# Patient Record
(demographics unavailable — no encounter records)

---

## 2025-04-02 NOTE — DISCUSSION/SUMMARY
[de-identified] : 17 Y  Patient will start PT and HEP to reduce pain. If she does not respond to therapy, an MRI and injections could be considered. Follow up in 6 weeks.

## 2025-04-02 NOTE — DATA REVIEWED
[FreeTextEntry1] : Reviewed BT Sacrum & Coccys x-rays from 11/24/2024: Reveals no fractures  I stop paperwork reviewed Ortho progress notes reviewed

## 2025-04-02 NOTE — HISTORY OF PRESENT ILLNESS
[Dull/Aching] : dull/aching [Sharp] : sharp [de-identified] : 03/31/2025: 17 Y F presents for initial Evalution for L spine pain since Nov 2024. She has been treated at  and got x-rays done.  Patient reports tripping over Somone's foot in basketball and fell backwards landing on her tailbone. She is currently doing Flag football and plays basketball. Patient reports shooting pains from L spine to T spine. Has been using donut pillow for comfort. Took Motrin with no relief. Has no radicular complaints. Has not attended PT yet.   [] : no [FreeTextEntry1] : L spine and T spine [FreeTextEntry5] : Currently doing Flag football, plays basketball  [FreeTextEntry2] : tripped over someone's foot in basketball and fell backwards landing on her tailbone  [FreeTextEntry7] : L spine to T spine [de-identified] : movement [de-identified] : XR [de-identified] : [facility] [de-identified] : XR

## 2025-04-02 NOTE — DISCUSSION/SUMMARY
[de-identified] : 17 Y  Patient will start PT and HEP to reduce pain. If she does not respond to therapy, an MRI and injections could be considered. Follow up in 6 weeks.

## 2025-04-02 NOTE — HISTORY OF PRESENT ILLNESS
[Dull/Aching] : dull/aching [Sharp] : sharp [de-identified] : 03/31/2025: 17 Y F presents for initial Evalution for L spine pain since Nov 2024. She has been treated at  and got x-rays done.  Patient reports tripping over Somone's foot in basketball and fell backwards landing on her tailbone. She is currently doing Flag football and plays basketball. Patient reports shooting pains from L spine to T spine. Has been using donut pillow for comfort. Took Motrin with no relief. Has no radicular complaints. Has not attended PT yet.   [] : no [FreeTextEntry1] : L spine and T spine [FreeTextEntry2] : tripped over someone's foot in basketball and fell backwards landing on her tailbone  [FreeTextEntry5] : Currently doing Flag football, plays basketball  [FreeTextEntry7] : L spine to T spine [de-identified] : movement [de-identified] : XR [de-identified] : [facility] [de-identified] : XR

## 2025-04-02 NOTE — PHYSICAL EXAM
[NL (90)] : forward flexion 90 degrees [NL (30)] : right lateral rotation 30 degrees [NL (45)] : extension 45 degrees [NL (40)] : right lateral bending 40 degrees [Extension] : extension [5___] : right extensor hallicus longus 5[unfilled]/5 [Normal Coordination] : normal coordination [Normal DTR UE/LE] : normal DTR UE/LE  [Normal Sensation] : normal sensation [Normal Mood and Affect] : normal mood and affect [Oriented] : oriented [Able to Communicate] : able to communicate [Normal Skin] : normal skin [No Rash] : no rash [No Ulcers] : no ulcers [No Lesions] : no lesions [No obvious lymphadenopathy in areas examined] : no obvious lymphadenopathy in areas examined [Well Developed] : well developed [Peripheral vascular exam is grossly normal] : peripheral vascular exam is grossly normal [No Respiratory Distress] : no respiratory distress [] : non-antalgic

## 2025-05-13 NOTE — DATA REVIEWED
[FreeTextEntry1] : Reviewed BT Sacrum & Coccys x-rays from 11/24/2024: Reveals no fractures  I stop paperwork reviewed Ortho progress notes reviewed PT progress otes reviewed

## 2025-05-13 NOTE — PHYSICAL EXAM
[Normal Coordination] : normal coordination [Normal DTR UE/LE] : normal DTR UE/LE  [Normal Sensation] : normal sensation [Normal Mood and Affect] : normal mood and affect [Oriented] : oriented [Able to Communicate] : able to communicate [Normal Skin] : normal skin [No Rash] : no rash [No Ulcers] : no ulcers [No Lesions] : no lesions [No obvious lymphadenopathy in areas examined] : no obvious lymphadenopathy in areas examined [Well Developed] : well developed [Well Nourished] : well nourished [Peripheral vascular exam is grossly normal] : peripheral vascular exam is grossly normal [No Respiratory Distress] : no respiratory distress [Lungs clear to auscultation bilaterally] : lungs clear to auscultation bilaterally [Normal Bowel Sounds] : normal bowel sounds [Non-Tender] : non-tender [No HSM] : no HSM [No Mass] : no mass [NL (90)] : forward flexion 90 degrees [NL (30)] : right lateral rotation 30 degrees [NL (45)] : extension 45 degrees [NL (40)] : right lateral bending 40 degrees [Extension] : extension [5___] : right extensor hallicus longus 5[unfilled]/5 [] : non-antalgic

## 2025-05-13 NOTE — DISCUSSION/SUMMARY
[de-identified] : 17 Y F w/ lumbar strain & coccydynia   Referred for lumbar spine MRI to eval for HNP, pars lysis, patient has continued low back pain despite physical therapy and medical management > 6 wks duration. In the interim she will continue current PT trial. Discussed possible interventional injection therapies pending pathology on mri and symptom complex.   F/u after mri    Prior to appointment and during encounter with patient extensive medical records were reviewed including but not limited to, hospital records, outpatient records, imaging results, and lab data. During this appointment the patient was examined, diagnoses were discussed and explained in a face to face manner. In addition extensive time was spent reviewing aforementioned diagnostic studies. Counseling including abnormal image results, differential diagnoses, treatment options, risk and benefits, lifestyle changes, current condition, and current medications was performed. Patient's comments, questions, and concerns were addressed and patient verbalized understanding. Based on this patient's presentation at our office, which is an orthopedic spine surgeon's office, this patient inherently / intrinsically has a risk, however minute, of developing issues such as Cauda equina syndrome, bowel and bladder changes, or progression of motor or neurological deficits such as paralysis which may be permanent.    ANI DA SILVA Acting as a Scribe for Dr. Gomez LOPEZ, Ani Da Silva, attest that this documentation has been prepared under the direction and in the presence of Provider Ba Dyer MD.

## 2025-05-13 NOTE — HISTORY OF PRESENT ILLNESS
[6] : 6 [Student] : Work status: student [de-identified] : 05/12/2025 - Patient returns to office for follow up visit. C/o centralized low back pain. No radic leg pain. She reports current PT as helpful in improving character of back pain & continues to make progress. Using donut cushion. Takes otc med with flares, usually involving extended sitting.   03/31/2025: 17 Y F presents for initial Evalution for L spine pain since Nov 2024. She has been treated at  and got x-rays done.  Patient reports tripping over Somone's foot in basketball and fell backwards landing on her tailbone. She is currently doing Flag football and plays basketball. Patient reports shooting pains from L spine to T spine. Has been using donut pillow for comfort. Took Motrin with no relief. Has no radicular complaints. Has not attended PT yet.   [de-identified] : p/t, hep

## 2025-06-10 NOTE — DISCUSSION/SUMMARY
[de-identified] : 17 Y F w/ lumbar disc herniations    MRIs discussed & reviewed along w/ contribution of symptoms. Severity of back pain trending positively with current physical therapy. I advised the patient to continue her current PT trial, with emphasis on core strengthening and hamstring stretching exercises (renewed). Do not recommend surgical intervention. Recommended f/u w/ OBGYN regarding adnexal cysts on sacrum MRI. F/u 6 WKS Cumulative encounter duration exceeded 30 minutes   Prior to appointment and during encounter with patient extensive medical records were reviewed including but not limited to, hospital records, outpatient records, imaging results, and lab data. During this appointment the patient was examined, diagnoses were discussed and explained in a face to face manner. In addition extensive time was spent reviewing aforementioned diagnostic studies. Counseling including abnormal image results, differential diagnoses, treatment options, risk and benefits, lifestyle changes, current condition, and current medications was performed. Patient's comments, questions, and concerns were addressed and patient verbalized understanding. Based on this patient's presentation at our office, which is an orthopedic spine surgeon's office, this patient inherently / intrinsically has a risk, however minute, of developing issues such as Cauda equina syndrome, bowel and bladder changes, or progression of motor or neurological deficits such as paralysis which may be permanent.    ANI DA SILVA Acting as a Scribe for Dr. Gomez LOPEZ, Ani Da Silva, attest that this documentation has been prepared under the direction and in the presence of Provider Ba Dyer MD.

## 2025-06-10 NOTE — DATA REVIEWED
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 L4/5 broad based posterior disc herniation. No fractures   Reviewed BT Sacrum & Coccys x-rays from 11/24/2024: Reveals no fractures  I stop paperwork reviewed PT progress notes reviewed

## 2025-06-10 NOTE — HISTORY OF PRESENT ILLNESS
[5] : 5 [6] : 6 [Student] : Work status: student [de-identified] : 06/09/2025 - Patient presents for Lumbar & Sacrum MRI Review. Physical therapy has been very helpful. Feels character of her pain remains the same regarding tailbone pain, without radiation into the legs. Severity of pain has been improving.   05/12/2025 - Patient returns to office for follow up visit. C/o centralized low back pain. No radic leg pain. She reports current PT as helpful in improving character of back pain & continues to make progress. Using donut cushion. Takes otc med with flares, usually involving extended sitting.   03/31/2025: 17 Y F presents for initial Evalution for L spine pain since Nov 2024. She has been treated at  and got x-rays done.  Patient reports tripping over Somone's foot in basketball and fell backwards landing on her tailbone. She is currently doing Flag football and plays basketball. Patient reports shooting pains from L spine to T spine. Has been using donut pillow for comfort. Took Motrin with no relief. Has no radicular complaints. Has not attended PT yet.    [de-identified] : p/t, hep

## 2025-06-10 NOTE — DISCUSSION/SUMMARY
[de-identified] : 17 Y F w/ lumbar disc herniations    MRIs discussed & reviewed along w/ contribution of symptoms. Severity of back pain trending positively with current physical therapy. I advised the patient to continue her current PT trial, with emphasis on core strengthening and hamstring stretching exercises (renewed). Do not recommend surgical intervention. Recommended f/u w/ OBGYN regarding adnexal cysts on sacrum MRI. F/u 6 WKS Cumulative encounter duration exceeded 30 minutes   Prior to appointment and during encounter with patient extensive medical records were reviewed including but not limited to, hospital records, outpatient records, imaging results, and lab data. During this appointment the patient was examined, diagnoses were discussed and explained in a face to face manner. In addition extensive time was spent reviewing aforementioned diagnostic studies. Counseling including abnormal image results, differential diagnoses, treatment options, risk and benefits, lifestyle changes, current condition, and current medications was performed. Patient's comments, questions, and concerns were addressed and patient verbalized understanding. Based on this patient's presentation at our office, which is an orthopedic spine surgeon's office, this patient inherently / intrinsically has a risk, however minute, of developing issues such as Cauda equina syndrome, bowel and bladder changes, or progression of motor or neurological deficits such as paralysis which may be permanent.    ANI DA SILVA Acting as a Scribe for Dr. Gomez LOPEZ, Ani Da Silva, attest that this documentation has been prepared under the direction and in the presence of Provider Ba Dyer MD.

## 2025-06-10 NOTE — PHYSICAL EXAM
[Normal Coordination] : normal coordination [Normal DTR UE/LE] : normal DTR UE/LE  [Normal Sensation] : normal sensation [Normal Mood and Affect] : normal mood and affect [Oriented] : oriented [Able to Communicate] : able to communicate [Normal Skin] : normal skin [No Rash] : no rash [No Ulcers] : no ulcers [No Lesions] : no lesions [No obvious lymphadenopathy in areas examined] : no obvious lymphadenopathy in areas examined [Well Developed] : well developed [Well Nourished] : well nourished [Peripheral vascular exam is grossly normal] : peripheral vascular exam is grossly normal [No Respiratory Distress] : no respiratory distress [Lungs clear to auscultation bilaterally] : lungs clear to auscultation bilaterally [Normal Bowel Sounds] : normal bowel sounds [Non-Tender] : non-tender [No HSM] : no HSM [No Mass] : no mass [NL (90)] : forward flexion 90 degrees [NL (30)] : right lateral rotation 30 degrees [NL (40)] : right lateral bending 40 degrees [NL (45)] : extension 45 degrees [Extension] : extension [5___] : right plantor flexors 5[unfilled]/5 [] : non-antalgic

## 2025-06-10 NOTE — HISTORY OF PRESENT ILLNESS
[5] : 5 [6] : 6 [Student] : Work status: student [de-identified] : 06/09/2025 - Patient presents for Lumbar & Sacrum MRI Review. Physical therapy has been very helpful. Feels character of her pain remains the same regarding tailbone pain, without radiation into the legs. Severity of pain has been improving.   05/12/2025 - Patient returns to office for follow up visit. C/o centralized low back pain. No radic leg pain. She reports current PT as helpful in improving character of back pain & continues to make progress. Using donut cushion. Takes otc med with flares, usually involving extended sitting.   03/31/2025: 17 Y F presents for initial Evalution for L spine pain since Nov 2024. She has been treated at  and got x-rays done.  Patient reports tripping over Somone's foot in basketball and fell backwards landing on her tailbone. She is currently doing Flag football and plays basketball. Patient reports shooting pains from L spine to T spine. Has been using donut pillow for comfort. Took Motrin with no relief. Has no radicular complaints. Has not attended PT yet.    [de-identified] : p/t, hep

## 2025-06-10 NOTE — PHYSICAL EXAM
[Normal Coordination] : normal coordination [Normal DTR UE/LE] : normal DTR UE/LE  [Normal Sensation] : normal sensation [Normal Mood and Affect] : normal mood and affect [Oriented] : oriented [Normal Skin] : normal skin [Able to Communicate] : able to communicate [No Rash] : no rash [No Ulcers] : no ulcers [No Lesions] : no lesions [No obvious lymphadenopathy in areas examined] : no obvious lymphadenopathy in areas examined [Well Developed] : well developed [Well Nourished] : well nourished [Peripheral vascular exam is grossly normal] : peripheral vascular exam is grossly normal [No Respiratory Distress] : no respiratory distress [Lungs clear to auscultation bilaterally] : lungs clear to auscultation bilaterally [Normal Bowel Sounds] : normal bowel sounds [Non-Tender] : non-tender [No HSM] : no HSM [No Mass] : no mass [NL (90)] : forward flexion 90 degrees [NL (30)] : right lateral bending 30 degrees [NL (45)] : extension 45 degrees [NL (40)] : left lateral bending 40 degrees [Extension] : extension [5___] : right plantor flexors 5[unfilled]/5 [] : non-antalgic